# Patient Record
Sex: MALE | Race: WHITE | NOT HISPANIC OR LATINO | URBAN - METROPOLITAN AREA
[De-identification: names, ages, dates, MRNs, and addresses within clinical notes are randomized per-mention and may not be internally consistent; named-entity substitution may affect disease eponyms.]

---

## 2018-08-22 ENCOUNTER — OFFICE VISIT (OUTPATIENT)
Dept: URBAN - METROPOLITAN AREA CLINIC 193 | Facility: CLINIC | Age: 73
End: 2018-08-22
Payer: COMMERCIAL

## 2018-08-22 DIAGNOSIS — H52.03 HYPERMETROPIA, BILATERAL: Primary | ICD-10-CM

## 2018-08-22 PROCEDURE — 92004 COMPRE OPH EXAM NEW PT 1/>: CPT | Performed by: OPTOMETRIST

## 2018-08-22 ASSESSMENT — INTRAOCULAR PRESSURE
OD: 12
OS: 12

## 2018-08-22 ASSESSMENT — VISUAL ACUITY
OD: 20/25-
OS: 20/25-

## 2018-08-22 NOTE — IMPRESSION/PLAN
Impression: Hypermetropia, bilateral: H52.03. Plan: A glasses prescription has been discussed and generated. Patient to call with any concerns.

## 2019-08-21 ENCOUNTER — Encounter (OUTPATIENT)
Dept: URBAN - METROPOLITAN AREA CLINIC 71 | Facility: CLINIC | Age: 74
End: 2019-08-21
Payer: COMMERCIAL

## 2019-08-21 PROCEDURE — 92014 COMPRE OPH EXAM EST PT 1/>: CPT | Performed by: OPTOMETRIST

## 2020-09-15 ENCOUNTER — OFFICE VISIT (OUTPATIENT)
Dept: URBAN - METROPOLITAN AREA CLINIC 72 | Facility: CLINIC | Age: 75
End: 2020-09-15
Payer: COMMERCIAL

## 2020-09-15 DIAGNOSIS — H53.2 DIPLOPIA: ICD-10-CM

## 2020-09-15 DIAGNOSIS — H02.839 DERMATOCHALASIS OF EYELID: ICD-10-CM

## 2020-09-15 DIAGNOSIS — H25.13 NUCLEAR SCLEROSIS CATARACT, BILATERAL: ICD-10-CM

## 2020-09-15 PROCEDURE — 92014 COMPRE OPH EXAM EST PT 1/>: CPT | Performed by: OPTOMETRIST

## 2020-09-15 ASSESSMENT — INTRAOCULAR PRESSURE
OD: 9
OS: 12

## 2020-09-15 ASSESSMENT — VISUAL ACUITY
OS: 20/20-
OD: 20/20-

## 2020-09-15 NOTE — IMPRESSION/PLAN
Impression: Diplopia: H53.2. when looks left and then has difficulty clearing vision back up when looking straight. No tropia eliceted today. Plan: Discussed. Call if worsens/returns.

## 2020-09-15 NOTE — IMPRESSION/PLAN
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 Bilateral. Plan: Discussed need for surgery only when they are hindering function.

## 2020-09-15 NOTE — IMPRESSION/PLAN
Impression: Vitreous degeneration, bilateral: H43.813. Asymptomatic OU Plan: Monitor. Call if worsens.

## 2021-07-01 ENCOUNTER — OFFICE VISIT (OUTPATIENT)
Dept: URBAN - METROPOLITAN AREA CLINIC 71 | Facility: CLINIC | Age: 76
End: 2021-07-01
Payer: COMMERCIAL

## 2021-07-01 DIAGNOSIS — H52.4 PRESBYOPIA: ICD-10-CM

## 2021-07-01 DIAGNOSIS — H43.813 VITREOUS DEGENERATION, BILATERAL: ICD-10-CM

## 2021-07-01 DIAGNOSIS — H40.033 ANATOMICAL NARROW ANGLE, BILATERAL: ICD-10-CM

## 2021-07-01 PROCEDURE — 92014 COMPRE OPH EXAM EST PT 1/>: CPT | Performed by: OPTOMETRIST

## 2021-07-01 PROCEDURE — 92134 CPTRZ OPH DX IMG PST SGM RTA: CPT | Performed by: OPTOMETRIST

## 2021-07-01 PROCEDURE — 92015 DETERMINE REFRACTIVE STATE: CPT | Performed by: OPTOMETRIST

## 2021-07-01 ASSESSMENT — INTRAOCULAR PRESSURE
OD: 9
OS: 8

## 2021-07-01 NOTE — IMPRESSION/PLAN
Impression: Anatomical narrow angle, bilateral: H40.033. Plan: Discussed findings with pt. Continue to monitor. Pt elects to proceed with cataract surgery. If pt does not follow through with cataract surgery, will need to refer to retina for further evaluation and possible treatment.

## 2021-07-01 NOTE — IMPRESSION/PLAN
Impression: Vitreous degeneration, bilateral: H43.813. Asymptomatic OU Plan: Continue to monitor with yearly DE. Call if worsens.

## 2021-07-01 NOTE — IMPRESSION/PLAN
Impression: Nuclear sclerosis cataract, bilateral: H25.13. Plan: Cataracts account for the patient's complaints. OCT ordered and performed today. Patient understands changing glasses will not significantly improve vision. Cataract surgery should improve vision. Patient willing to have surgery, OS first. Recommend cataract consult.

## 2021-08-18 ENCOUNTER — PRE-OPERATIVE VISIT (OUTPATIENT)
Dept: URBAN - METROPOLITAN AREA CLINIC 71 | Facility: CLINIC | Age: 76
End: 2021-08-18
Payer: COMMERCIAL

## 2021-08-18 DIAGNOSIS — H25.11 AGE-RELATED NUCLEAR CATARACT, RIGHT EYE: ICD-10-CM

## 2021-08-18 PROCEDURE — 92136 OPHTHALMIC BIOMETRY: CPT | Performed by: OPHTHALMOLOGY

## 2021-08-18 PROCEDURE — 92012 INTRM OPH EXAM EST PATIENT: CPT | Performed by: OPHTHALMOLOGY

## 2021-08-18 ASSESSMENT — PACHYMETRY
OS: 22.31
OD: 2.49
OS: 2.42
OD: 22.54

## 2021-08-18 ASSESSMENT — INTRAOCULAR PRESSURE
OS: 18
OD: 21

## 2021-08-18 NOTE — IMPRESSION/PLAN
Impression: Age-related nuclear cataract, bilateral: H25.13. Plan:  Discussed cataract diagnosis with the patient. Discussed and reviewed treatment options for cataracts. Surgical treatment is necessary to improve vision. Risks and benefits of surgical treatment were discussed and understood. Patient elects surgical treatment. Recommend surgery OU, OS first. Patient is candidate/interested in ORA, Aim OD: plano. Aim OS: plano. Patient will need glasses for full time wear. Dr. Jeri Elizalde, DO, ordered testing: IOL Master, iTrace, Optos for today's PreOp. PROCEED WITH CARE PLUS CATARACT SURGERY WITH ORA, OS FIRST, DISTANCE, THEN OD, DISTANCE, DEXYCU, CS DONE.

## 2021-10-11 ENCOUNTER — SURGERY (OUTPATIENT)
Dept: URBAN - METROPOLITAN AREA SURGERY 44 | Facility: SURGERY | Age: 76
End: 2021-10-11
Payer: COMMERCIAL

## 2021-10-11 ENCOUNTER — SURGERY (OUTPATIENT)
Dept: URBAN - METROPOLITAN AREA SURGERY 45 | Facility: SURGERY | Age: 76
End: 2021-10-11
Payer: COMMERCIAL

## 2021-10-11 DIAGNOSIS — H57.03 MIOSIS: Primary | ICD-10-CM

## 2021-10-11 PROCEDURE — 66982 XCAPSL CTRC RMVL CPLX WO ECP: CPT | Performed by: OPHTHALMOLOGY

## 2021-10-12 ENCOUNTER — POST-OPERATIVE VISIT (OUTPATIENT)
Dept: URBAN - METROPOLITAN AREA CLINIC 71 | Facility: CLINIC | Age: 76
End: 2021-10-12
Payer: MEDICARE

## 2021-10-12 PROCEDURE — 99024 POSTOP FOLLOW-UP VISIT: CPT | Performed by: OPHTHALMOLOGY

## 2021-10-12 ASSESSMENT — INTRAOCULAR PRESSURE
OS: 10
OD: 9

## 2021-10-25 ENCOUNTER — SURGERY (OUTPATIENT)
Dept: URBAN - METROPOLITAN AREA SURGERY 44 | Facility: SURGERY | Age: 76
End: 2021-10-25
Payer: COMMERCIAL

## 2021-10-25 ENCOUNTER — SURGERY (OUTPATIENT)
Dept: URBAN - METROPOLITAN AREA SURGERY 45 | Facility: SURGERY | Age: 76
End: 2021-10-25
Payer: COMMERCIAL

## 2021-10-25 PROCEDURE — 66982 XCAPSL CTRC RMVL CPLX WO ECP: CPT | Performed by: OPHTHALMOLOGY

## 2021-10-26 ENCOUNTER — POST-OPERATIVE VISIT (OUTPATIENT)
Dept: URBAN - METROPOLITAN AREA CLINIC 71 | Facility: CLINIC | Age: 76
End: 2021-10-26
Payer: COMMERCIAL

## 2021-10-26 PROCEDURE — 99024 POSTOP FOLLOW-UP VISIT: CPT | Performed by: OPHTHALMOLOGY

## 2021-10-26 ASSESSMENT — INTRAOCULAR PRESSURE
OD: 16
OS: 7

## 2021-10-26 NOTE — IMPRESSION/PLAN
Impression: S/P COMPLEX Cataract Extraction by phacoemulsification with IOL placement; ORA OD - 1 Day. Presence of intraocular lens  Z96.1. Plan: Patient educated on today's findings. Patient to continue w/ lubricating OTC ATs prn OU. Patient advised that vision will clear day by day after swelling subsides. Patient to RTC immediately if having any issues especially any tenderness and/or pain.

## 2021-11-19 ENCOUNTER — POST-OPERATIVE VISIT (OUTPATIENT)
Dept: URBAN - METROPOLITAN AREA CLINIC 71 | Facility: CLINIC | Age: 76
End: 2021-11-19
Payer: COMMERCIAL

## 2021-11-19 DIAGNOSIS — Z48.810 ENCOUNTER FOR SURGICAL AFTERCARE FOLLOWING SURGERY ON A SENSE ORGAN: Primary | ICD-10-CM

## 2021-11-19 PROCEDURE — 99024 POSTOP FOLLOW-UP VISIT: CPT | Performed by: OPTOMETRIST

## 2021-11-19 ASSESSMENT — INTRAOCULAR PRESSURE
OS: 7
OD: 7

## 2021-11-19 ASSESSMENT — VISUAL ACUITY
OD: 20/25
OS: 20/25

## 2021-11-19 NOTE — IMPRESSION/PLAN
Impression: S/P COMPLEX Cataract Extraction by phacoemulsification with IOL placement; ORA OD - 25 Days. Encounter for surgical aftercare following surgery on a sense organ  Z48.810. Excellent post op course   Post operative instructions reviewed - Well healed. Plan: Discussed. Reassured patient that the cataract surgery successfully improved his correctable vision. Pt advised that the floaters are not related to the surgery. Pt declines getting a new glasses Rx today.  Pt advised to contact office if deciding to get new glasses Rx

## 2022-02-15 ENCOUNTER — OFFICE VISIT (OUTPATIENT)
Dept: URBAN - METROPOLITAN AREA CLINIC 71 | Facility: CLINIC | Age: 77
End: 2022-02-15
Payer: COMMERCIAL

## 2022-02-15 DIAGNOSIS — D31.32 BENIGN NEOPLASM OF LEFT CHOROID: ICD-10-CM

## 2022-02-15 DIAGNOSIS — Z96.1 PRESENCE OF INTRAOCULAR LENS: ICD-10-CM

## 2022-02-15 DIAGNOSIS — H04.123 DRY EYE SYNDROME OF BILATERAL LACRIMAL GLANDS: Primary | ICD-10-CM

## 2022-02-15 PROCEDURE — 99214 OFFICE O/P EST MOD 30 MIN: CPT | Performed by: OPHTHALMOLOGY

## 2022-02-15 ASSESSMENT — INTRAOCULAR PRESSURE
OS: 8
OD: 8

## 2022-02-15 NOTE — IMPRESSION/PLAN
Impression: Vitreous degeneration, bilateral: H43.813. No holes, tears, or detachments on physical exam or Optos performed 2/15/2022 Plan: Discussed. Informed patient that surgical treatment is typically not performed unless the floaters are very bothersome and affecting daily life. Contact office with any new or worsening symptoms.  Continue to monitor

## 2022-02-15 NOTE — IMPRESSION/PLAN
Impression: Presence of intraocular lens: Z96.1.  Plan: Informed patient that cataract surgery successfully helped improve VAs compared to before surgery

## 2022-02-15 NOTE — IMPRESSION/PLAN
Impression: Dry eye syndrome of bilateral lacrimal glands: H04.123. Pt reports haze over vision is intermittent, especially while watching TV and similar activities. Plan: Discussed diagnosis in detail with patient. Informed patient that Parkinson's typically contributes to worsening dryness due to decrease unconscious blinking. Advised patient to consciously blink thoroughly as much as possible to help with dryness. Recommend ATs as needed for dryness.  Contact office if any issues or worsening symptoms

## 2022-02-15 NOTE — IMPRESSION/PLAN
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  Continue to monitor for changes

## 2022-07-09 NOTE — IMPRESSION/PLAN
Impression: Dermatochalasis of eyelid: H02.839 Bilateral. Plan: Discussed diagnosis in detail with patient. Discussed treatment options with patient, though none is required at this time. Will continue to observe condition and or symptoms. If pt would like to proceed with treatment recommend oculoplastic consult, with Dr Lyric Hernandez.

## 2022-07-09 NOTE — IMPRESSION/PLAN
Impression: Nystagmus: H55.00. Acquired nystagmus Nystagmus becomes great when dissociated Plan: Discussed DX in detail with pt Due to nystagmus has been acquired recently recommend pt to get imaging done Printed out order for MRI brain and orbits in clinic today and given to pt to take to [place]. Will get imaging results and call pt with results. If pt is needing to be seen after imaging will call pt and set up at that time. PT to RTC if any new or worsening in symptoms Pt understands

## 2022-09-22 NOTE — IMPRESSION/PLAN
Impression: Nystagmus: H55.00. - OCT ordered today by Dr. Carol Whatley and reviewed. 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 Will continue to monitor at this time. Patient voices understanding and recommend patient to call if any changes in vision occur.

## 2022-09-22 NOTE — IMPRESSION/PLAN
Impression: Dry eye syndrome of bilateral lacrimal glands: H04.123. Plan: Recommend patient increase using OTC artificial tears 2-4x daily. Recommend using Refresh or Systane gtts. patient voices understanding.

## 2023-02-16 NOTE — IMPRESSION/PLAN
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 Plan: Discussed that condition could be caused by Parkinson's. Pt to call and return if condition remains constant or worsens. 136

## 2023-02-16 NOTE — IMPRESSION/PLAN
Impression: Dry eye syndrome of bilateral lacrimal glands: H04.123. Plan: Discussed using restasis PRN OU to improve vision due to lack of tear film.

## 2023-02-16 NOTE — IMPRESSION/PLAN
Impression: Vitreous degeneration, bilateral: H43.813. Plan: Condition appears stable. Discussed the possibility of a Vitrectomy if floaters become more bothersome.

## 2023-02-16 NOTE — IMPRESSION/PLAN
Impression: Presence of intraocular lens: Z96.1. Plan: PC IOLs in place and healthy. Continue to monitor.

## 2023-03-30 NOTE — IMPRESSION/PLAN
Impression: Presbyopia: H52.4. Plan: Dispensed new glasses Rx today with the addition of prism. Discussed changes and possible adaptation period. PT advised when adding prism it can take some adjustments to get him use to the prism. Patient to call if any issues with new glasses occur.

## 2023-05-15 NOTE — IMPRESSION/PLAN
Impression: Diplopia: H53.2. Intermittent and transient. Associated with Parkinson's. Plan: Will Continue to monitor. Current glasses with Prism are working well and patient to RTC yearly for dilated exams.